# Patient Record
Sex: MALE | Race: WHITE | NOT HISPANIC OR LATINO | Employment: FULL TIME | URBAN - METROPOLITAN AREA
[De-identification: names, ages, dates, MRNs, and addresses within clinical notes are randomized per-mention and may not be internally consistent; named-entity substitution may affect disease eponyms.]

---

## 2022-10-26 ENCOUNTER — TRANSFERRED RECORDS (OUTPATIENT)
Dept: HEALTH INFORMATION MANAGEMENT | Facility: CLINIC | Age: 47
End: 2022-10-26

## 2022-10-27 ENCOUNTER — HOSPITAL ENCOUNTER (INPATIENT)
Facility: CLINIC | Age: 47
LOS: 2 days | Discharge: HOME | End: 2022-10-29
Attending: HOSPITALIST | Admitting: HOSPITALIST
Payer: COMMERCIAL

## 2022-10-27 ENCOUNTER — APPOINTMENT (OUTPATIENT)
Dept: ULTRASOUND IMAGING | Facility: CLINIC | Age: 47
End: 2022-10-27
Attending: HOSPITALIST
Payer: COMMERCIAL

## 2022-10-27 DIAGNOSIS — K27.4 GASTROINTESTINAL HEMORRHAGE ASSOCIATED WITH PEPTIC ULCER: Primary | ICD-10-CM

## 2022-10-27 DIAGNOSIS — K92.2 GASTROINTESTINAL HEMORRHAGE, UNSPECIFIED GASTROINTESTINAL HEMORRHAGE TYPE: ICD-10-CM

## 2022-10-27 DIAGNOSIS — J18.9 PNEUMONIA OF BOTH LOWER LOBES DUE TO INFECTIOUS ORGANISM: ICD-10-CM

## 2022-10-27 LAB
ABO/RH(D): NORMAL
ANION GAP SERPL CALCULATED.3IONS-SCNC: 4 MMOL/L (ref 3–14)
ANTIBODY SCREEN: NEGATIVE
BUN SERPL-MCNC: 9 MG/DL (ref 7–30)
CALCIUM SERPL-MCNC: 8.3 MG/DL (ref 8.5–10.1)
CHLORIDE BLD-SCNC: 104 MMOL/L (ref 94–109)
CO2 SERPL-SCNC: 27 MMOL/L (ref 20–32)
CREAT SERPL-MCNC: 0.95 MG/DL (ref 0.66–1.25)
ERYTHROCYTE [DISTWIDTH] IN BLOOD BY AUTOMATED COUNT: 23.1 % (ref 10–15)
GFR SERPL CREATININE-BSD FRML MDRD: >90 ML/MIN/1.73M2
GLUCOSE BLD-MCNC: 107 MG/DL (ref 70–99)
GLUCOSE BLDC GLUCOMTR-MCNC: 109 MG/DL (ref 70–99)
HCT VFR BLD AUTO: 32 % (ref 40–53)
HGB BLD-MCNC: 9.1 G/DL (ref 13.3–17.7)
HGB BLD-MCNC: 9.1 G/DL (ref 13.3–17.7)
INR PPP: 1.11 (ref 0.85–1.15)
LACTATE SERPL-SCNC: 1.2 MMOL/L (ref 0.7–2)
MAGNESIUM SERPL-MCNC: 2.2 MG/DL (ref 1.6–2.3)
MCH RBC QN AUTO: 20.7 PG (ref 26.5–33)
MCHC RBC AUTO-ENTMCNC: 28.4 G/DL (ref 31.5–36.5)
MCV RBC AUTO: 73 FL (ref 78–100)
PHOSPHATE SERPL-MCNC: 2.2 MG/DL (ref 2.5–4.5)
PLATELET # BLD AUTO: 197 10E3/UL (ref 150–450)
POTASSIUM BLD-SCNC: 3.8 MMOL/L (ref 3.4–5.3)
RADIOLOGIST FLAGS: ABNORMAL
RBC # BLD AUTO: 4.39 10E6/UL (ref 4.4–5.9)
SARS-COV-2 RNA RESP QL NAA+PROBE: NEGATIVE
SODIUM SERPL-SCNC: 135 MMOL/L (ref 133–144)
SPECIMEN EXPIRATION DATE: NORMAL
WBC # BLD AUTO: 8.1 10E3/UL (ref 4–11)

## 2022-10-27 PROCEDURE — 258N000003 HC RX IP 258 OP 636: Performed by: HOSPITALIST

## 2022-10-27 PROCEDURE — 86901 BLOOD TYPING SEROLOGIC RH(D): CPT | Performed by: HOSPITALIST

## 2022-10-27 PROCEDURE — 84100 ASSAY OF PHOSPHORUS: CPT | Performed by: HOSPITALIST

## 2022-10-27 PROCEDURE — C9113 INJ PANTOPRAZOLE SODIUM, VIA: HCPCS | Performed by: HOSPITALIST

## 2022-10-27 PROCEDURE — 83735 ASSAY OF MAGNESIUM: CPT | Performed by: HOSPITALIST

## 2022-10-27 PROCEDURE — 250N000013 HC RX MED GY IP 250 OP 250 PS 637: Performed by: HOSPITALIST

## 2022-10-27 PROCEDURE — 83605 ASSAY OF LACTIC ACID: CPT | Performed by: HOSPITALIST

## 2022-10-27 PROCEDURE — 85014 HEMATOCRIT: CPT | Performed by: HOSPITALIST

## 2022-10-27 PROCEDURE — 99223 1ST HOSP IP/OBS HIGH 75: CPT | Mod: AI | Performed by: HOSPITALIST

## 2022-10-27 PROCEDURE — 86850 RBC ANTIBODY SCREEN: CPT | Performed by: HOSPITALIST

## 2022-10-27 PROCEDURE — 85610 PROTHROMBIN TIME: CPT | Performed by: HOSPITALIST

## 2022-10-27 PROCEDURE — 93971 EXTREMITY STUDY: CPT | Mod: RT

## 2022-10-27 PROCEDURE — U0003 INFECTIOUS AGENT DETECTION BY NUCLEIC ACID (DNA OR RNA); SEVERE ACUTE RESPIRATORY SYNDROME CORONAVIRUS 2 (SARS-COV-2) (CORONAVIRUS DISEASE [COVID-19]), AMPLIFIED PROBE TECHNIQUE, MAKING USE OF HIGH THROUGHPUT TECHNOLOGIES AS DESCRIBED BY CMS-2020-01-R: HCPCS | Performed by: HOSPITALIST

## 2022-10-27 PROCEDURE — 120N000001 HC R&B MED SURG/OB

## 2022-10-27 PROCEDURE — HZ2ZZZZ DETOXIFICATION SERVICES FOR SUBSTANCE ABUSE TREATMENT: ICD-10-PCS | Performed by: HOSPITALIST

## 2022-10-27 PROCEDURE — 250N000011 HC RX IP 250 OP 636: Performed by: HOSPITALIST

## 2022-10-27 PROCEDURE — 80048 BASIC METABOLIC PNL TOTAL CA: CPT | Performed by: HOSPITALIST

## 2022-10-27 PROCEDURE — 36415 COLL VENOUS BLD VENIPUNCTURE: CPT | Performed by: HOSPITALIST

## 2022-10-27 RX ORDER — AZITHROMYCIN 500 MG/1
500 INJECTION, POWDER, LYOPHILIZED, FOR SOLUTION INTRAVENOUS ONCE
Status: DISCONTINUED | OUTPATIENT
Start: 2022-10-27 | End: 2022-10-27

## 2022-10-27 RX ORDER — ACETAMINOPHEN 650 MG/1
650 SUPPOSITORY RECTAL EVERY 6 HOURS PRN
Status: DISCONTINUED | OUTPATIENT
Start: 2022-10-27 | End: 2022-10-29 | Stop reason: HOSPADM

## 2022-10-27 RX ORDER — LOSARTAN POTASSIUM 25 MG/1
25 TABLET ORAL DAILY
COMMUNITY

## 2022-10-27 RX ORDER — FOLIC ACID 1 MG/1
1 TABLET ORAL DAILY
Status: DISCONTINUED | OUTPATIENT
Start: 2022-10-27 | End: 2022-10-27

## 2022-10-27 RX ORDER — LIDOCAINE 40 MG/G
CREAM TOPICAL
Status: DISCONTINUED | OUTPATIENT
Start: 2022-10-27 | End: 2022-10-28

## 2022-10-27 RX ORDER — PROCHLORPERAZINE 25 MG
25 SUPPOSITORY, RECTAL RECTAL EVERY 12 HOURS PRN
Status: DISCONTINUED | OUTPATIENT
Start: 2022-10-27 | End: 2022-10-29 | Stop reason: HOSPADM

## 2022-10-27 RX ORDER — FOLIC ACID 1 MG/1
1 TABLET ORAL DAILY
Status: DISCONTINUED | OUTPATIENT
Start: 2022-10-28 | End: 2022-10-29 | Stop reason: HOSPADM

## 2022-10-27 RX ORDER — SODIUM CHLORIDE, SODIUM LACTATE, POTASSIUM CHLORIDE, CALCIUM CHLORIDE 600; 310; 30; 20 MG/100ML; MG/100ML; MG/100ML; MG/100ML
INJECTION, SOLUTION INTRAVENOUS CONTINUOUS
Status: DISCONTINUED | OUTPATIENT
Start: 2022-10-27 | End: 2022-10-28

## 2022-10-27 RX ORDER — DIAZEPAM 10 MG/2ML
5-10 INJECTION, SOLUTION INTRAMUSCULAR; INTRAVENOUS EVERY 30 MIN PRN
Status: DISCONTINUED | OUTPATIENT
Start: 2022-10-27 | End: 2022-10-29 | Stop reason: HOSPADM

## 2022-10-27 RX ORDER — GABAPENTIN 300 MG/1
300 CAPSULE ORAL EVERY 8 HOURS
Status: DISCONTINUED | OUTPATIENT
Start: 2022-11-02 | End: 2022-10-29 | Stop reason: HOSPADM

## 2022-10-27 RX ORDER — BISACODYL 10 MG
10 SUPPOSITORY, RECTAL RECTAL DAILY PRN
Status: DISCONTINUED | OUTPATIENT
Start: 2022-10-27 | End: 2022-10-29 | Stop reason: HOSPADM

## 2022-10-27 RX ORDER — LIDOCAINE 40 MG/G
CREAM TOPICAL
Status: DISCONTINUED | OUTPATIENT
Start: 2022-10-27 | End: 2022-10-27

## 2022-10-27 RX ORDER — MULTIPLE VITAMINS W/ MINERALS TAB 9MG-400MCG
1 TAB ORAL DAILY
Status: DISCONTINUED | OUTPATIENT
Start: 2022-10-28 | End: 2022-10-29 | Stop reason: HOSPADM

## 2022-10-27 RX ORDER — AZITHROMYCIN 250 MG/1
250 TABLET, FILM COATED ORAL DAILY
Status: DISCONTINUED | OUTPATIENT
Start: 2022-10-28 | End: 2022-10-29 | Stop reason: HOSPADM

## 2022-10-27 RX ORDER — PROCHLORPERAZINE MALEATE 10 MG
10 TABLET ORAL EVERY 6 HOURS PRN
Status: DISCONTINUED | OUTPATIENT
Start: 2022-10-27 | End: 2022-10-29 | Stop reason: HOSPADM

## 2022-10-27 RX ORDER — GABAPENTIN 600 MG/1
1200 TABLET ORAL ONCE
Status: COMPLETED | OUTPATIENT
Start: 2022-10-27 | End: 2022-10-27

## 2022-10-27 RX ORDER — AMOXICILLIN 250 MG
2 CAPSULE ORAL 2 TIMES DAILY PRN
Status: DISCONTINUED | OUTPATIENT
Start: 2022-10-27 | End: 2022-10-29 | Stop reason: HOSPADM

## 2022-10-27 RX ORDER — OCTREOTIDE ACETATE 50 UG/ML
50 INJECTION, SOLUTION INTRAVENOUS; SUBCUTANEOUS ONCE
Status: DISCONTINUED | OUTPATIENT
Start: 2022-10-27 | End: 2022-10-27

## 2022-10-27 RX ORDER — CLOBETASOL PROPIONATE 0.5 MG/G
CREAM TOPICAL 2 TIMES DAILY
COMMUNITY

## 2022-10-27 RX ORDER — CLONIDINE HYDROCHLORIDE 0.1 MG/1
0.1 TABLET ORAL EVERY 8 HOURS
Status: DISCONTINUED | OUTPATIENT
Start: 2022-10-27 | End: 2022-10-27

## 2022-10-27 RX ORDER — AMOXICILLIN 250 MG
1 CAPSULE ORAL 2 TIMES DAILY PRN
Status: DISCONTINUED | OUTPATIENT
Start: 2022-10-27 | End: 2022-10-29 | Stop reason: HOSPADM

## 2022-10-27 RX ORDER — FLUMAZENIL 0.1 MG/ML
0.2 INJECTION, SOLUTION INTRAVENOUS
Status: DISCONTINUED | OUTPATIENT
Start: 2022-10-27 | End: 2022-10-29 | Stop reason: HOSPADM

## 2022-10-27 RX ORDER — GABAPENTIN 300 MG/1
900 CAPSULE ORAL EVERY 8 HOURS
Status: DISCONTINUED | OUTPATIENT
Start: 2022-10-28 | End: 2022-10-29 | Stop reason: HOSPADM

## 2022-10-27 RX ORDER — ACETAMINOPHEN 325 MG/1
650 TABLET ORAL EVERY 6 HOURS PRN
Status: DISCONTINUED | OUTPATIENT
Start: 2022-10-27 | End: 2022-10-29 | Stop reason: HOSPADM

## 2022-10-27 RX ORDER — NITROGLYCERIN 0.4 MG/1
0.4 TABLET SUBLINGUAL EVERY 5 MIN PRN
Status: DISCONTINUED | OUTPATIENT
Start: 2022-10-27 | End: 2022-10-28

## 2022-10-27 RX ORDER — HALOPERIDOL 5 MG/ML
2.5-5 INJECTION INTRAMUSCULAR EVERY 6 HOURS PRN
Status: DISCONTINUED | OUTPATIENT
Start: 2022-10-27 | End: 2022-10-29 | Stop reason: HOSPADM

## 2022-10-27 RX ORDER — POLYETHYLENE GLYCOL 3350 17 G/17G
17 POWDER, FOR SOLUTION ORAL DAILY PRN
Status: DISCONTINUED | OUTPATIENT
Start: 2022-10-27 | End: 2022-10-29 | Stop reason: HOSPADM

## 2022-10-27 RX ORDER — CEFTRIAXONE 2 G/1
2 INJECTION, POWDER, FOR SOLUTION INTRAMUSCULAR; INTRAVENOUS EVERY 24 HOURS
Status: DISCONTINUED | OUTPATIENT
Start: 2022-10-28 | End: 2022-10-29 | Stop reason: HOSPADM

## 2022-10-27 RX ORDER — OLANZAPINE 5 MG/1
5-10 TABLET, ORALLY DISINTEGRATING ORAL EVERY 6 HOURS PRN
Status: DISCONTINUED | OUTPATIENT
Start: 2022-10-27 | End: 2022-10-29 | Stop reason: HOSPADM

## 2022-10-27 RX ORDER — GABAPENTIN 300 MG/1
600 CAPSULE ORAL EVERY 8 HOURS
Status: DISCONTINUED | OUTPATIENT
Start: 2022-10-31 | End: 2022-10-29 | Stop reason: HOSPADM

## 2022-10-27 RX ORDER — MULTIPLE VITAMINS W/ MINERALS TAB 9MG-400MCG
1 TAB ORAL DAILY
Status: DISCONTINUED | OUTPATIENT
Start: 2022-10-27 | End: 2022-10-27

## 2022-10-27 RX ORDER — DIAZEPAM 5 MG
10 TABLET ORAL EVERY 30 MIN PRN
Status: DISCONTINUED | OUTPATIENT
Start: 2022-10-27 | End: 2022-10-29 | Stop reason: HOSPADM

## 2022-10-27 RX ORDER — GABAPENTIN 100 MG/1
100 CAPSULE ORAL EVERY 8 HOURS
Status: DISCONTINUED | OUTPATIENT
Start: 2022-11-04 | End: 2022-10-29 | Stop reason: HOSPADM

## 2022-10-27 RX ADMIN — SODIUM CHLORIDE, POTASSIUM CHLORIDE, SODIUM LACTATE AND CALCIUM CHLORIDE: 600; 310; 30; 20 INJECTION, SOLUTION INTRAVENOUS at 20:55

## 2022-10-27 RX ADMIN — GABAPENTIN 1200 MG: 600 TABLET, FILM COATED ORAL at 22:21

## 2022-10-27 RX ADMIN — PANTOPRAZOLE SODIUM 40 MG: 40 INJECTION, POWDER, FOR SOLUTION INTRAVENOUS at 22:22

## 2022-10-27 ASSESSMENT — ACTIVITIES OF DAILY LIVING (ADL)
DOING_ERRANDS_INDEPENDENTLY_DIFFICULTY: NO
CHANGE_IN_FUNCTIONAL_STATUS_SINCE_ONSET_OF_CURRENT_ILLNESS/INJURY: NO
ADLS_ACUITY_SCORE: 22
TOILETING_ISSUES: NO
WEAR_GLASSES_OR_BLIND: YES
WALKING_OR_CLIMBING_STAIRS_DIFFICULTY: YES
WALKING_OR_CLIMBING_STAIRS: OTHER (SEE COMMENTS)
TRANSFERRING: 1-->ASSISTANCE (EQUIPMENT/PERSON) NEEDED
CONCENTRATING,_REMEMBERING_OR_MAKING_DECISIONS_DIFFICULTY: NO
ADLS_ACUITY_SCORE: 35
VISION_MANAGEMENT: READING GLASSES
TRANSFERRING: 1-->ASSISTANCE (EQUIPMENT/PERSON) NEEDED (NOT DEVELOPMENTALLY APPROPRIATE)
DIFFICULTY_EATING/SWALLOWING: NO
DRESSING/BATHING_DIFFICULTY: NO
FALL_HISTORY_WITHIN_LAST_SIX_MONTHS: NO

## 2022-10-28 ENCOUNTER — ANESTHESIA (OUTPATIENT)
Dept: GASTROENTEROLOGY | Facility: CLINIC | Age: 47
End: 2022-10-28
Payer: COMMERCIAL

## 2022-10-28 ENCOUNTER — ANESTHESIA EVENT (OUTPATIENT)
Dept: GASTROENTEROLOGY | Facility: CLINIC | Age: 47
End: 2022-10-28
Payer: COMMERCIAL

## 2022-10-28 LAB
ALBUMIN SERPL-MCNC: 3 G/DL (ref 3.4–5)
ALP SERPL-CCNC: 66 U/L (ref 40–150)
ALT SERPL W P-5'-P-CCNC: 18 U/L (ref 0–70)
ANION GAP SERPL CALCULATED.3IONS-SCNC: 6 MMOL/L (ref 3–14)
AST SERPL W P-5'-P-CCNC: 23 U/L (ref 0–45)
BILIRUB DIRECT SERPL-MCNC: 0.2 MG/DL (ref 0–0.2)
BILIRUB SERPL-MCNC: 0.8 MG/DL (ref 0.2–1.3)
BUN SERPL-MCNC: 8 MG/DL (ref 7–30)
CALCIUM SERPL-MCNC: 8.5 MG/DL (ref 8.5–10.1)
CHLORIDE BLD-SCNC: 105 MMOL/L (ref 94–109)
CO2 SERPL-SCNC: 24 MMOL/L (ref 20–32)
CREAT SERPL-MCNC: 0.9 MG/DL (ref 0.66–1.25)
GFR SERPL CREATININE-BSD FRML MDRD: >90 ML/MIN/1.73M2
GLUCOSE BLD-MCNC: 94 MG/DL (ref 70–99)
HGB BLD-MCNC: 8.6 G/DL (ref 13.3–17.7)
HGB BLD-MCNC: 8.8 G/DL (ref 13.3–17.7)
INR PPP: 1.08 (ref 0.85–1.15)
PHOSPHATE SERPL-MCNC: 3.1 MG/DL (ref 2.5–4.5)
PHOSPHATE SERPL-MCNC: 3.1 MG/DL (ref 2.5–4.5)
PLATELET # BLD AUTO: 180 10E3/UL (ref 150–450)
POTASSIUM BLD-SCNC: 3.5 MMOL/L (ref 3.4–5.3)
POTASSIUM BLD-SCNC: 3.6 MMOL/L (ref 3.4–5.3)
PROT SERPL-MCNC: 7.1 G/DL (ref 6.8–8.8)
SODIUM SERPL-SCNC: 135 MMOL/L (ref 133–144)
UPPER GI ENDOSCOPY: NORMAL

## 2022-10-28 PROCEDURE — 999N000010 HC STATISTIC ANES STAT CODE-CRNA PER MINUTE: Performed by: INTERNAL MEDICINE

## 2022-10-28 PROCEDURE — 250N000009 HC RX 250: Performed by: INTERNAL MEDICINE

## 2022-10-28 PROCEDURE — C9113 INJ PANTOPRAZOLE SODIUM, VIA: HCPCS | Performed by: HOSPITALIST

## 2022-10-28 PROCEDURE — 120N000001 HC R&B MED SURG/OB

## 2022-10-28 PROCEDURE — 250N000013 HC RX MED GY IP 250 OP 250 PS 637: Performed by: HOSPITALIST

## 2022-10-28 PROCEDURE — 84132 ASSAY OF SERUM POTASSIUM: CPT | Performed by: INTERNAL MEDICINE

## 2022-10-28 PROCEDURE — 250N000011 HC RX IP 250 OP 636: Performed by: NURSE ANESTHETIST, CERTIFIED REGISTERED

## 2022-10-28 PROCEDURE — 258N000003 HC RX IP 258 OP 636: Performed by: HOSPITALIST

## 2022-10-28 PROCEDURE — 99233 SBSQ HOSP IP/OBS HIGH 50: CPT | Performed by: INTERNAL MEDICINE

## 2022-10-28 PROCEDURE — 370N000017 HC ANESTHESIA TECHNICAL FEE, PER MIN: Performed by: INTERNAL MEDICINE

## 2022-10-28 PROCEDURE — 36415 COLL VENOUS BLD VENIPUNCTURE: CPT | Performed by: HOSPITALIST

## 2022-10-28 PROCEDURE — 250N000013 HC RX MED GY IP 250 OP 250 PS 637: Performed by: INTERNAL MEDICINE

## 2022-10-28 PROCEDURE — 43235 EGD DIAGNOSTIC BRUSH WASH: CPT | Performed by: INTERNAL MEDICINE

## 2022-10-28 PROCEDURE — 250N000009 HC RX 250: Performed by: NURSE ANESTHETIST, CERTIFIED REGISTERED

## 2022-10-28 PROCEDURE — 0DJ08ZZ INSPECTION OF UPPER INTESTINAL TRACT, VIA NATURAL OR ARTIFICIAL OPENING ENDOSCOPIC: ICD-10-PCS | Performed by: INTERNAL MEDICINE

## 2022-10-28 PROCEDURE — 85018 HEMOGLOBIN: CPT | Performed by: HOSPITALIST

## 2022-10-28 PROCEDURE — 250N000011 HC RX IP 250 OP 636: Performed by: HOSPITALIST

## 2022-10-28 PROCEDURE — 258N000003 HC RX IP 258 OP 636: Performed by: INTERNAL MEDICINE

## 2022-10-28 PROCEDURE — 82248 BILIRUBIN DIRECT: CPT | Performed by: NURSE PRACTITIONER

## 2022-10-28 PROCEDURE — 999N000216 HC STATISTIC ADULT CD FACE TO FACE-NO CHRG

## 2022-10-28 PROCEDURE — 36415 COLL VENOUS BLD VENIPUNCTURE: CPT | Performed by: NURSE PRACTITIONER

## 2022-10-28 PROCEDURE — 85610 PROTHROMBIN TIME: CPT | Performed by: NURSE PRACTITIONER

## 2022-10-28 PROCEDURE — 84100 ASSAY OF PHOSPHORUS: CPT | Performed by: INTERNAL MEDICINE

## 2022-10-28 PROCEDURE — 82435 ASSAY OF BLOOD CHLORIDE: CPT | Performed by: HOSPITALIST

## 2022-10-28 PROCEDURE — 85049 AUTOMATED PLATELET COUNT: CPT | Performed by: INTERNAL MEDICINE

## 2022-10-28 RX ORDER — PROPOFOL 10 MG/ML
INJECTION, EMULSION INTRAVENOUS CONTINUOUS PRN
Status: DISCONTINUED | OUTPATIENT
Start: 2022-10-28 | End: 2022-10-28

## 2022-10-28 RX ORDER — FENTANYL CITRATE 50 UG/ML
INJECTION, SOLUTION INTRAMUSCULAR; INTRAVENOUS PRN
Status: DISCONTINUED | OUTPATIENT
Start: 2022-10-28 | End: 2022-10-28

## 2022-10-28 RX ORDER — SODIUM CHLORIDE, SODIUM LACTATE, POTASSIUM CHLORIDE, CALCIUM CHLORIDE 600; 310; 30; 20 MG/100ML; MG/100ML; MG/100ML; MG/100ML
INJECTION, SOLUTION INTRAVENOUS CONTINUOUS
Status: DISCONTINUED | OUTPATIENT
Start: 2022-10-28 | End: 2022-10-28 | Stop reason: HOSPADM

## 2022-10-28 RX ORDER — HYDROMORPHONE HCL IN WATER/PF 6 MG/30 ML
0.2 PATIENT CONTROLLED ANALGESIA SYRINGE INTRAVENOUS EVERY 5 MIN PRN
Status: DISCONTINUED | OUTPATIENT
Start: 2022-10-28 | End: 2022-10-28 | Stop reason: HOSPADM

## 2022-10-28 RX ORDER — OXYCODONE HYDROCHLORIDE 5 MG/1
5 TABLET ORAL EVERY 4 HOURS PRN
Status: DISCONTINUED | OUTPATIENT
Start: 2022-10-28 | End: 2022-10-29 | Stop reason: HOSPADM

## 2022-10-28 RX ORDER — LIDOCAINE 40 MG/G
CREAM TOPICAL
Status: DISCONTINUED | OUTPATIENT
Start: 2022-10-28 | End: 2022-10-28 | Stop reason: HOSPADM

## 2022-10-28 RX ORDER — ONDANSETRON 2 MG/ML
4 INJECTION INTRAMUSCULAR; INTRAVENOUS EVERY 30 MIN PRN
Status: DISCONTINUED | OUTPATIENT
Start: 2022-10-28 | End: 2022-10-28 | Stop reason: HOSPADM

## 2022-10-28 RX ORDER — HYDRALAZINE HYDROCHLORIDE 10 MG/1
10 TABLET, FILM COATED ORAL EVERY 6 HOURS PRN
Status: DISCONTINUED | OUTPATIENT
Start: 2022-10-28 | End: 2022-10-29 | Stop reason: HOSPADM

## 2022-10-28 RX ORDER — POTASSIUM CHLORIDE 1500 MG/1
20 TABLET, EXTENDED RELEASE ORAL ONCE
Status: COMPLETED | OUTPATIENT
Start: 2022-10-28 | End: 2022-10-28

## 2022-10-28 RX ORDER — FENTANYL CITRATE 50 UG/ML
25 INJECTION, SOLUTION INTRAMUSCULAR; INTRAVENOUS EVERY 5 MIN PRN
Status: DISCONTINUED | OUTPATIENT
Start: 2022-10-28 | End: 2022-10-28 | Stop reason: HOSPADM

## 2022-10-28 RX ORDER — LOSARTAN POTASSIUM 25 MG/1
25 TABLET ORAL DAILY
Status: DISCONTINUED | OUTPATIENT
Start: 2022-10-28 | End: 2022-10-29 | Stop reason: HOSPADM

## 2022-10-28 RX ORDER — ONDANSETRON 4 MG/1
4 TABLET, ORALLY DISINTEGRATING ORAL EVERY 30 MIN PRN
Status: DISCONTINUED | OUTPATIENT
Start: 2022-10-28 | End: 2022-10-28 | Stop reason: HOSPADM

## 2022-10-28 RX ADMIN — SODIUM CHLORIDE, POTASSIUM CHLORIDE, SODIUM LACTATE AND CALCIUM CHLORIDE: 600; 310; 30; 20 INJECTION, SOLUTION INTRAVENOUS at 12:01

## 2022-10-28 RX ADMIN — CEFTRIAXONE SODIUM 2 G: 2 INJECTION, POWDER, FOR SOLUTION INTRAMUSCULAR; INTRAVENOUS at 09:23

## 2022-10-28 RX ADMIN — GABAPENTIN 900 MG: 300 CAPSULE ORAL at 05:43

## 2022-10-28 RX ADMIN — TOPICAL ANESTHETIC 2 SPRAY: 200 SPRAY DENTAL; PERIODONTAL at 12:04

## 2022-10-28 RX ADMIN — SODIUM PHOSPHATE, MONOBASIC, MONOHYDRATE 15 MMOL: 276; 142 INJECTION, SOLUTION INTRAVENOUS at 01:02

## 2022-10-28 RX ADMIN — AZITHROMYCIN MONOHYDRATE 250 MG: 250 TABLET ORAL at 09:22

## 2022-10-28 RX ADMIN — SODIUM CHLORIDE, POTASSIUM CHLORIDE, SODIUM LACTATE AND CALCIUM CHLORIDE: 600; 310; 30; 20 INJECTION, SOLUTION INTRAVENOUS at 05:46

## 2022-10-28 RX ADMIN — FENTANYL CITRATE 50 MCG: 50 INJECTION, SOLUTION INTRAMUSCULAR; INTRAVENOUS at 12:04

## 2022-10-28 RX ADMIN — FOLIC ACID 1 MG: 1 TABLET ORAL at 09:21

## 2022-10-28 RX ADMIN — GABAPENTIN 900 MG: 300 CAPSULE ORAL at 21:05

## 2022-10-28 RX ADMIN — PANTOPRAZOLE SODIUM 40 MG: 40 INJECTION, POWDER, FOR SOLUTION INTRAVENOUS at 09:22

## 2022-10-28 RX ADMIN — MULTIPLE VITAMINS W/ MINERALS TAB 1 TABLET: TAB at 09:22

## 2022-10-28 RX ADMIN — POTASSIUM CHLORIDE 20 MEQ: 1500 TABLET, EXTENDED RELEASE ORAL at 17:17

## 2022-10-28 RX ADMIN — THIAMINE HCL TAB 100 MG 100 MG: 100 TAB at 09:22

## 2022-10-28 RX ADMIN — LOSARTAN POTASSIUM 25 MG: 25 TABLET, FILM COATED ORAL at 21:05

## 2022-10-28 RX ADMIN — PROPOFOL 200 MCG/KG/MIN: 10 INJECTION, EMULSION INTRAVENOUS at 12:05

## 2022-10-28 ASSESSMENT — ACTIVITIES OF DAILY LIVING (ADL)
ADLS_ACUITY_SCORE: 22

## 2022-10-28 ASSESSMENT — COPD QUESTIONNAIRES
COPD: 1
CAT_SEVERITY: MILD

## 2022-10-28 ASSESSMENT — LIFESTYLE VARIABLES: TOBACCO_USE: 1

## 2022-10-28 NOTE — PROVIDER NOTIFICATION
MD Notification    Notified Person: MD    Notified Person Name: Tommy Ruth    Notification Date/Time: 10/28/22 0126    Notification Interaction: text page    Purpose of Notification:Ultrasound to RLE results: superficial thrombophlebitis in right great saphenous vein. Pt currently denies pain.     Orders Received:    Comments:

## 2022-10-28 NOTE — INTERVAL H&P NOTE
"I have reviewed the surgical (or preoperative) H&P that is linked to this encounter, and examined the patient. There are no significant changes    Clinical Conditions Present on Arrival:  Clinically Significant Risk Factors Present on Admission           # Hyponatremia: Lowest Na = 135 mmol/L (Ref range: 136-145) in last 2 days, will monitor as appropriate      # Hypoalbuminemia: Lowest albumin = 3 g/dL (Ref range: 3.5-5.2) at 10/28/2022  5:35 AM, will monitor as appropriate     # Obesity: Estimated body mass index is 31.35 kg/m  as calculated from the following:    Height as of this encounter: 1.803 m (5' 11\").    Weight as of this encounter: 102 kg (224 lb 12.8 oz).       "

## 2022-10-28 NOTE — PROGRESS NOTES
[facility]    Hospitalist Progress Note    Assessment & Plan   Date of Admission:  10/27/2022        Assessment & Plan     Reji Marcum is a 47 year old male past medical history significant for alcohol abuse, HTN, HLD, DVT no longer taking anticoagulation who was directly admitted from [facility] on 10/27/22 for hematemesis, anemia, elevated lactic acid, and pneumonia.  Transferred to [facility] for GI evaluation.       Possible hematemesis,    Acute blood loss anemia  Hgb 6.8, lactate 4 at OSH. Transfused 2 units PRBC prior to transfer.  Reports large BRBPR 2 days ago, none since (hx of this in past attributed to internal hemorrhoid vs polyp per patient).  He denies hematemesis but this was reported by outside facility.  Endorses recent frequent cough with post-tussive emesis.  -Hb stable in 8-9 range follow blood transfusion at outside facility  - nl vitals to slightly hypertensive.   - seen by [facility].   -EGD showed: nl esophagus, A single 3 mm erosion with no stigmata of recent bleeding was found in the gastric antrum. The examined duodenum was normal.  - 2 brown stools since last episode of bloody stools.   -doubt further bleeding.   - unclear source.   -nl LFTs, nl lactate, bmp nl. Platelet count 180-197, INR 1.0  -no indication for colonoscopy during this hospital stay.,   -  Plan;   - [facility] consult  -regular diet per GI.   - stop  ml/hr  -[facility] to arrange for outpatient colonoscopy in coming 1-3 weeks.   - am cbc   -stop PPI    Alcohol use disorder, severe  Unspecified Depressive Disorder  Substance-Related & Addictive Disorders Alcohol Use Disorder     Presented to [place] overnight with hx EtOH abuse ( drinking 1/2 gallon whisky daily).   - CT revealed fatty liver but no reported cirrhosis.  Interested in resuming treatment at his prior treatment program.    -pt met by phone with chem dep, pt declined CD referrals or resources. Pt intends to follow up with his   and states able to contact prior cd treatment facility.   Afebrile. Not tachy, nl neuro exam. CIWA 0-1.   No sigsn etoh w/d  -pt met with psych team. Declines psych meds or services  Plan  - CIWA protocol   - oral vitamins  - gabapentin taper   -pcp follow up     Community acquired pneumonia  CXR and EKG ok. CT revealed pneumonia. Non-productive cough, no hypoxia.  Received ceftriaxone at OSH.   CT abd outside hospital showed bibasilar opacities.   - ~1 week cough prod sputum now dry cough, better. One episode of sputum with slight streak of blood.   -lungs clear. Off oxygen.     - continue ceftriaxone and azithro, treate for 7 days.   - Supplemental O2 as needed     Elevated lactic acid-resolved.   LA 4 at OSH. Already initiated on abx  - Repeat LA level 1.2.  No further bleeding   -nl bp to moderate htn.   - Monitor hemodynamics , q4 hours vitals  -stop imc status.   - Treat PNA as above     Hx RLE Superficial thrombophlebitis- greater saphanous vein (No dvt)  Was on DOAC but stopped this on his own after 30 days due to hematochezia.  -RLE US on admission showed:               1.  No deep venous thrombosis in the right lower extremity.     2.  Superficial thrombophlebitis in the right great saphenous vein within the proximal-mid thigh and proximal calf.    -pt recommended to have 6 month of anticoaguation and thinks he was told he had a DVT but on review of outside records, pt had NO DVT but did have superficial clot/thrombophlebitis of greater saphanous vein.   - no further indication of antiocoagulation at this time. Follow for sxs which has resolved.   -seek medical attention if recurrent RLE sxs.          HTN  Nl bmp  Restart losartant, follow bp     HLD  - not currently on treatment     Psoriasis  Noted     COVID screen negative 10/26/22           Diet: regular diet per gi    DVT Prophylaxis: Pneumatic Compression Devices  [place] Catheter: Not present  Central Lines: None  Cardiac Monitoring:  ACTIVE order. Indication: upper GI bleed on IMC  Code Status: Full Code  Full code per patient         Clinically Significant Risk Factors Present on Admission            # Hyponatremia: Lowest Na = 135 mmol/L (Ref range: 136-145) in last 2 days, will monitor as appropriate          # Hypertension: home medication list includes antihypertensive(s)                    Disposition Plan        Expected Discharge Date: 10/29/2022       Discussed care plan with Pt and GI and rn      Dale Montgomery MD, MD  Text Page  (7am to 6pm)  Interval History   egd this am. No signs etoh w/d   No cp,sob, n/v/d, abd pain  Cough is better.   Hb stable 9 range    -Data reviewed today: I reviewed all new labs and imaging results over the last 24 hours. I personally reviewed labs and imaging since admission.     Physical Exam   Temp: 98  F (36.7  C) Temp src: Oral BP: (!) 151/86 Pulse: 58   Resp: 28 SpO2: 99 % O2 Device: None (Room air)    Vitals:    10/27/22 2010 10/28/22 0029   Weight: 102.6 kg (226 lb 4.8 oz) 102 kg (224 lb 12.8 oz)     Vital Signs with Ranges  Temp:  [97.8  F (36.6  C)-98.6  F (37  C)] 98  F (36.7  C)  Pulse:  [45-75] 58  Resp:  [9-28] 28  BP: (131-182)/() 151/86  SpO2:  [97 %-100 %] 99 %  I/O last 3 completed shifts:  In: 1528.33 [P.O.:220; I.V.:1308.33]  Out: 4175 [Urine:4175]    Constitutional: Nad, up in bed  Respiratory: CTAB  Cardiovascular: RRR no r/g/m  GI: soft, nt, nd  Skin/Integumen: no rash or edema  No pain R LE. No venous cord or swelling  Neuro. No tremor, nl speech and mentation. Fully oriented.   Psych: nl affect.        Medications     lactated ringers Stopped (10/28/22 1209)       azithromycin  250 mg Oral Daily     cefTRIAXone  2 g Intravenous Q24H     folic acid  1 mg Oral Daily     [START ON 11/4/2022] gabapentin  100 mg Oral Q8H     [START ON 11/2/2022] gabapentin  300 mg Oral Q8H     [START ON 10/31/2022] gabapentin  600 mg Oral Q8H     gabapentin  900 mg Oral Q8H     multivitamin  w/minerals  1 tablet Oral Daily     pantoprazole  40 mg Intravenous BID     sodium chloride (PF)  3 mL Intracatheter Q8H     thiamine  100 mg Oral Daily       Data   Recent Labs   Lab 10/28/22  1300 10/28/22  0858 10/28/22  0535 10/27/22  2218 10/27/22  2049   WBC  --   --   --   --  8.1   HGB 8.6*  --  8.8*  --  9.1*  9.1*   MCV  --   --   --   --  73*   PLT  --  180  --   --  197   INR  --  1.08  --   --  1.11   NA  --   --  135  --  135   POTASSIUM  --  3.6 3.5  --  3.8   CHLORIDE  --   --  105  --  104   CO2  --   --  24  --  27   BUN  --   --  8  --  9   CR  --   --  0.90  --  0.95   ANIONGAP  --   --  6  --  4   MIKE  --   --  8.5  --  8.3*   GLC  --   --  94 109* 107*   ALBUMIN  --   --  3.0*  --   --    PROTTOTAL  --   --  7.1  --   --    BILITOTAL  --   --  0.8  --   --    ALKPHOS  --   --  66  --   --    ALT  --   --  18  --   --    AST  --   --  23  --   --        Imaging:   Recent Results (from the past 24 hour(s))   US Lower Extremity Venous Duplex Right   Result Value    Radiologist flags (Urgent)     Right great saphenous vein superficial thrombophlebitis.    Narrative    EXAM: US LOWER EXTREMITY VENOUS DUPLEX RIGHT  LOCATION: [facility]  DATE/TIME: 10/27/2022 9:34 PM    INDICATION: Right leg pain, history of right lower extremity DVT; noncompliant with anticoagulation. Assess whether resolved.  COMPARISON: The patient's prior ultrasound is not available for direct comparison.  TECHNIQUE: Venous Duplex ultrasound of the right lower extremity with and without compression, augmentation and duplex. Color flow and spectral Doppler with waveform analysis performed.    FINDINGS: Exam includes the common femoral, femoral, popliteal, and contralateral common femoral veins as well as segmentally visualized deep calf veins and greater saphenous vein.     RIGHT: No deep vein thrombosis. No popliteal cyst.    There is occlusive thrombus in the right great saphenous vein in the proximal  thigh. The thrombus extends from a level approximately 2.9 cm peripheral to the saphenofemoral junction into the mid thigh. There is also occlusive thrombus in the great   saphenous vein in the proximal calf.       Impression    IMPRESSION:  1.  No deep venous thrombosis in the right lower extremity.    2.  Superficial thrombophlebitis in the right great saphenous vein within the proximal-mid thigh and proximal calf.      [Access Center: Right great saphenous vein superficial thrombophlebitis.]    This report will be copied to the [facility] to ensure a provider acknowledges the finding. Access Center is available Monday through Friday 8am-3:30 pm.

## 2022-10-28 NOTE — ANESTHESIA POSTPROCEDURE EVALUATION
Patient: Reji Marcum    Procedure: Procedure(s):  ESOPHAGOGASTRODUODENOSCOPY (EGD)       Anesthesia Type:  MAC    Note:  Disposition: Inpatient   Postop Pain Control: Uneventful            Sign Out: Well controlled pain   PONV:    Neuro/Psych: Uneventful            Sign Out: Acceptable/Baseline neuro status   Airway/Respiratory: Uneventful            Sign Out: Acceptable/Baseline resp. status   CV/Hemodynamics: Uneventful            Sign Out: Acceptable CV status   Other NRE: NONE   DID A NON-ROUTINE EVENT OCCUR? No           Last vitals:  Vitals Value Taken Time   /90 10/28/22 1240   Temp     Pulse 55 10/28/22 1250   Resp 15 10/28/22 1251   SpO2 97 % 10/28/22 1252   Vitals shown include unvalidated device data.    Electronically Signed By: Wallace Carbajal MD  October 28, 2022  1:01 PM

## 2022-10-28 NOTE — PLAN OF CARE
Goal Outcome Evaluation:         Alert and oriented x4. Vital signs stable on RA, ex bradycardia and hypertension. SBA in room. Tolerating NPO diet. Lung sounds clear. + flatus, BM -, states last BM prior to ambulance transport from previous hospital. Adequate urine output. Denies pain and nausea. Tele SB 1st degree AVB with episodes of SB 2nd degree type 1 AVB. CIWA score 0-1. Ultrasound to R thigh this shift. LR running at 100 mL/hr.

## 2022-10-28 NOTE — PROVIDER NOTIFICATION
MD Notification    Notified Person: MD    Notified Person Name: Tommy Ruth    Notification Date/Time:10/28/22 2798    Notification Interaction: text page    Purpose of Notification:Pt has been hypertensive and low HR. Reports being off losartan for about 3-4 days now. Would you like to restart PTA BP med tomorrow or order a PRN for if he maintains over SBP>180?    Orders Received: hydralazine 10 mg PO every 6 hour PRN for SBP > 180     Comments:

## 2022-10-28 NOTE — UTILIZATION REVIEW
Admission Status; Secondary Review Determination       Under the authority of the Utilization Management Committee, the utilization review process indicated a secondary review on the above patient. The review outcome is based on review of the medical records, discussions with staff, and applying clinical experience noted on the date of the review.     (x) Inpatient Status Appropriate - This patient's medical care is consistent with medical management for inpatient care and reasonable inpatient medical practice.     RATIONALE FOR DETERMINATION   47 year old male with PMH including alcohol abuse, HTN, HLD, DVT no longer taking anticoagulation who was directly admitted from [facility] on 10/27/22 for hematemesis, anemia, elevated lactic acid, and pneumonia.  Received 2 units of blood at outside facility - hgb 6.8-8.8  Patient requires inpatient admission versus short stay observation or outpatient treatment for the following reasons: Patient presenting with hematemesis hematochezia and acute blood [place] anemia with hemoglobin below 7 lactate at 4 requiring 2 units of blood transfusion n.p.o. status comprehensive GI work-up in the setting of alcohol use disorder at significant risk for alcohol withdrawal presentation complicated by community-acquired pneumonia and lower extremity thrombophlebitis.  The expected length of stay at the time of admission was more than 2 nights because of the severity of illness, intensity of service provided, and risk for adverse outcome. Inpatient admission is appropriate.         This document was produced using voice recognition software       The information on this document is developed by the utilization review team in order for the business office to ensure compliance. This only denotes the appropriateness of proper admission status and does not reflect the quality of care rendered.   The definitions of Inpatient Status and Observation Status used in making the determination  above are those provided in the CMS Coverage Manual, Chapter 1 and Chapter 6, section 70.4.   Sincerely,   LEILANI RIVERS MD   System Medical Director   Utilization Management   [facility].

## 2022-10-28 NOTE — CONSULTS
10/28/2022      Spoke with pt via telephone to offer CD services. Pt reports he does not need an evaluation and referrals to inpatient CD treatment. Pt reports he does not need any CD resources. Pt reports he intends to follow up with his  to see what he needs to do. Pt reports he is able to contact his previous CD treatment facility ([place]) for care. Pt reports he may return to [place] where he has family. Pt is able to follow up with his providers for CD services.    [facility]   [address] [address] [address]  [phone]    ABELARDO Estrada  Phone: [phone]  Email: [email or web address]

## 2022-10-28 NOTE — CONSULTS
"Triage and Transition - Consult and Liaison     Reji Marcum  October 28, 2022    Session start: 2:50 pm  Session end: 3:21 pm   Session duration in minutes: 31  CPT utilized: 79218 - Brief diagnostic assessment (modifier 52)  Patient was seen virtually ([employer] cart or other teleconferencing device).    Diagnosis:   311 (F32.9) Unspecified Depressive Disorder , present;  Substance-Related & Addictive Disorders Alcohol Use Disorder   303.90 (F10.20) Severe, by history and present;       Plan/Recommendations:     Continue care coordination.    Pt declines any MH services or any desire for psych meds at this time, he states no many times.  Pt is open to therapy, however he states he will be moving to [place] with brother soon and will start there.     If pt were to change mind please re-consult.    Maintain current transition plan per care team.    Please enter another psychiatry if further visits are needed. Patients are not followed by Psychiatry C&L Service unless otherwise indicated.       Reason for consult: Psychiatry consult was requested due to \"alcohol use disorder\". Patient was seen by Triage and Transition Consult & Liaison team.      Identifying information: Reji is 47 year old Data Unavailable  male   followed related to \"alcohol use disorder\".   Pt reports he was \"born and raised in [place]\".  Pt reports he currently works at a [employer], and that he lives alone in \"[facility] currently, pt reports he is soon moving to [place] with family\".      Summary of Patient Situation  Pt agreeable to meet via  today.  Pt eating lunch, pt states his mood as \"I feel great, all my test came back good\".  Per chart review pt does not appear to endorse much of a psych. Hx with information available in epic at the time of this assessment.  Pt does endorse a CD hx with a hx of CD tx at [facility] program\".      Today pt presents with mild depression sx ,sadness, lonlieness, isolation, when asked " "about drinking he states \"I just sit at home and drink when I am alone, my brother and family just called and they are going to help support me and I plan to stay with them in [place], where I will have support\".  Pt talked at length about this plan, states his brother is \"flying to [place] from [place], will help him move his stuff and drive pts truck back to [place] with pt\".  Pt states he has been renting in [facility] prior to this.  Pt adamantly denies any major depression, or anxiety sx, he states \"nope, I just like to drink\".  Pt reports he has had periods of sobriety up to \"1 year and 6 months in 2018\" showed a tattoo representing this accomplishment, states \"I can and will do it again\".   Pt denies any SI, HI, AH/VH and.or bety sx.  Pt reports he is hopeful and is looking forward to seeing his brother.      Upon inquiry regarding therapy, pt denies any desire or offers for OP therapy appt setup.  Pt declines psych meds many times, states he will \"go to therapy in [place]\".  Pt talked briefly about his childhood and growing up with a \"70 year old father, and 18 year old mom, as well as foster care\".  Pt states this is what he plans to address when he starts OP therapy.  Clinician offered to set up OP MH appts in [place], pt declined.  If pt change mind please re-consult.      Brief Psychosocial History  Pt reports he currently works at a [employer], and that he lives alone in \"[facility] currently, pt reports he is soon moving to [place] with family\". Pt reports he was \"born and raised in [place]\".  Pt reports he is not  and does not have any children.     Significant Clinical History  Per chart review, pt does not appear to endorse a psych hsp hx, or much pf a MH hx at all, with information available in epic at the time of this assessment.  Pt does endorse CD Tx Hx with \"[facility]\".    Collateral information:   Reviewed chart.      Mental Status Exam   Affect: " Appropriate  Appearance: Appropriate   Attention Span/Concentration: Attentive    Eye Contact: Variable  Fund of Knowledge: Appropriate   Language /Speech Content: Fluent  Language /Speech Volume: Normal   Language /Speech Rate/Productions: Normal   Recent Memory: Intact  Remote Memory: Intact  Mood: Normal   Orientation:   Person: Yes   Place: Yes  Time of Day: Yes   Date: Yes   Situation (Do they understand why they are here?): Yes   Psychomotor Behavior: Normal   Thought Content: Clear  Thought Form: Intact    Current medications: Per EHR  Current Facility-Administered Medications   Medication     acetaminophen (TYLENOL) tablet 650 mg    Or     acetaminophen (TYLENOL) Suppository 650 mg     azithromycin (ZITHROMAX) tablet 250 mg     bisacodyl (DULCOLAX) suppository 10 mg     cefTRIAXone (ROCEPHIN) 2 g vial to attach to  ml bag for ADULTS or NS 50 ml bag for PEDS     diazepam (VALIUM) tablet 10 mg    Or     diazepam (VALIUM) injection 5-10 mg     flumazenil (ROMAZICON) injection 0.2 mg     folic acid (FOLVITE) tablet 1 mg     [START ON 11/4/2022] gabapentin (NEURONTIN) capsule 100 mg     [START ON 11/2/2022] gabapentin (NEURONTIN) capsule 300 mg     [START ON 10/31/2022] gabapentin (NEURONTIN) capsule 600 mg     gabapentin (NEURONTIN) capsule 900 mg     OLANZapine zydis (zyPREXA) ODT tab 5-10 mg    Or     haloperidol lactate (HALDOL) injection 2.5-5 mg     hydrALAZINE (APRESOLINE) tablet 10 mg     lactated ringers infusion     melatonin tablet 1 mg     melatonin tablet 5 mg     multivitamin w/minerals (THERA-VIT-M) tablet 1 tablet     oxyCODONE (ROXICODONE) tablet 5 mg     pantoprazole (PROTONIX) IV push injection 40 mg     polyethylene glycol (MIRALAX) Packet 17 g     prochlorperazine (COMPAZINE) injection 10 mg    Or     prochlorperazine (COMPAZINE) tablet 10 mg    Or     prochlorperazine (COMPAZINE) suppository 25 mg     senna-docusate (SENOKOT-S/PERICOLACE) 8.6-50 MG per tablet 1 tablet    Or      senna-docusate (SENOKOT-S/PERICOLACE) 8.6-50 MG per tablet 2 tablet     sodium chloride (PF) 0.9% PF flush 3 mL     sodium chloride (PF) 0.9% PF flush 3 mL     thiamine (B-1) tablet 100 mg      Therapeutic intervention and progress:  Therapeutic intervention consisted of building therapeutic rapport, active listening, validation, thought reframing, engaging in learning/practicing coping skills, stress relief practices and CBT concepts.    Iqra Watts MSW, LGSW  Triage and Transition - Consult and Liaison   [phone]

## 2022-10-28 NOTE — H&P
[facility]    History and Physical - Hospitalist Service       Date of Admission:  10/27/2022    Assessment & Plan      Reji Marcum is a 47 year old male past medical history significant for alcohol abuse, HTN, HLD, DVT no longer taking anticoagulation who was directly admitted from [facility] on 10/27/22 for hematemesis, anemia, elevated lactic acid, and pneumonia.  Transferred to [facility] for GI evaluation.       Possible hematemesis, suspect Magda Martinez tear  Hematochezia  Acute blood loss anemia  Hgb 6.8, lactate 4 at OSH. Transfused 2 units PRBC prior to transfer.  Reports large BRBPR 2 days ago, none since (hx of this in past attributed to internal hemorrhoid vs polyp per patient).  He denies hematemesis but this was reported by outside facility.  Endorses recent frequent cough with post-tussive emesis.  - STAT hgb and type and screen now, trend Hgb q6h (consented at admission)  - Transfuse for Hgb <7 (conditional orders entered)  - [facility] consult  - PPI IV bid, will not continue octreotide as no clear history of cirrhosis  - clear liquid diet, NPO midnight   -  ml/hr    Alcohol use disorder, severe  Presented to [place] overnight with hx EtOH abuse ( drinking 1/2 gallon whisky daily).  CT revealed fatty liver but no reported cirrhosis.  Interested in resuming treatment at his prior treatment program.    - Psychiatry and Chemical dependency consultations for EtOH abuse  - [facility] protocol   - oral vitamins  - currently hypertensive but will hold on clonidine with concern for GIB  - gabapentin taper     Community acquired pneumonia  CXR and EKG ok. CT revealed pneumonia. Non-productive cough, no hypoxia.  Received ceftriaxone at OSH.   - continue ceftriaxone and azithro  - Supplemental O2 as needed     Elevated lactic acid  LA 4 at OSH. Already initiated on abx  - Repeat LA level  - Monitor hemodynamics closely  - IMC status  - Treat PNA as above    Hx RLE DVT  Was  on DOAC but stopped this on his own after 30 days due to hematochezia.  - repeat RLE US    HTN  - hold PTA losartan for now in setting of GIB, resume as appropriate    HLD  - not currently on treatment    Psoriasis  Noted     COVID screen negative 10/26/22       Diet: Clear Liquid Diet  NPO per Anesthesia Guidelines for Procedure/Surgery Except for: Meds, Ice Chips clear liquids, NPO midnight   DVT Prophylaxis: Pneumatic Compression Devices  Rothman Catheter: Not present  Central Lines: None  Cardiac Monitoring: ACTIVE order. Indication: upper GI bleed on IMC  Code Status: Full Code  Full code per patient     Clinically Significant Risk Factors Present on Admission         # Hyponatremia: Lowest Na = 135 mmol/L (Ref range: 136-145) in last 2 days, will monitor as appropriate          # Hypertension: home medication list includes antihypertensive(s)             Disposition Plan      Expected Discharge Date: 10/29/2022              The patient's care was discussed with the Bedside Nurse and Patient.    Christo Vela MD  Hospitalist Service  [facility]  Securely message with the Vocera Web Console (learn more here)  Text page via [employer] Paging/Directory         ______________________________________________________________________    Chief Complaint   GI bleed     History is obtained from the patient and review of outside records.     History of Present Illness   Reji Marcum is a 47 year old male who was admitted to outside hospital with acute blood loss anemia, transferred to [facility] for GI evaluation.  He reports he has been drinking 1/2 gallon whiskey since leaving rehab in February of earlier this year.  He denies any history of alcohol withdrawal or seizure disorder, though outside records indicate that a friend reported he does develop withdrawal symptoms.  He reports interest in quitting alcohol as well as interest in returning to [place] for ongoing treatment.  He is  agreeable to psychiatry and chemical dependency evaluations.      He reports onset of dry cough approximately 4 days ago but denies any associated fevers or chills, sweats or shortness of breath.  Reports his cough has been so severe it has occasionally triggered emesis.  He denies any nausea or abdominal pain otherwise.  He denies any recent hematemesis or coffee-ground emesis.  Two days ago he reports an episode of bright red blood per rectum which happened on 1 occasion.  He has had no bleeding since.  Reports a prior history of bright red blood per rectum which was attributed to an internal hemorrhoid versus a colon polyp on prior colonoscopy in [place], [place].  Reports he is no longer taking anticoagulation as he took himself off of this once he developed recurrent bleeding.  He denies any current lightheadedness.   He denies any chest pain or pressure.  His remainder of review of systems otherwise negative.      Review of Systems    The 10 point Review of Systems is negative other than noted in the HPI or here.     Past Medical History    I have reviewed this patient's medical history and updated it with pertinent information if needed.   Past Medical History:   Diagnosis Date     Deep vein thrombosis (DVT) of right lower extremity (H)      HLD (hyperlipidemia)      HTN (hypertension)      Psoriasis        Past Surgical History   I have reviewed this patient's surgical history and updated it with pertinent information if needed.  Past Surgical History:   Procedure Laterality Date     CATARACT EXTRACTION         Social History   I have reviewed this patient's social history and updated it with pertinent information if needed.  Social History     Tobacco Use     Smoking status: Every Day     Packs/day: 0.50     Types: Cigarettes     Smokeless tobacco: Never   Substance Use Topics     Alcohol use: Yes     Comment: 1/2 gallon [employer] daily     Drug use: Never       Family History     Denies any history of CAD,  CVA or cancer.     Prior to Admission Medications   Prior to Admission Medications   Prescriptions Last Dose Informant Patient Reported? Taking?   clobetasol (TEMOVATE) 0.05 % external cream Past Month  Yes Yes   Sig: Apply topically 2 times daily   losartan (COZAAR) 25 MG tablet Past Week  Yes Yes   Sig: Take 25 mg by mouth daily   secukinumab (COSENTYX) 150 MG/ML Sensoready pen Past Month  Yes Yes   Sig: Inject 300 mg Subcutaneous every 28 days      Facility-Administered Medications: None     Allergies   No Known Allergies    Physical Exam   Vital Signs: Temp: 98.6  F (37  C) Temp src: Oral BP: (!) 168/94 Pulse: 57   Resp: 13 SpO2: 100 % O2 Device: None (Room air)    Weight: 226 lbs 4.8 oz    General Appearance: Well-nourished male in no acute distress  Eyes: Pupils equal, round reactive to light, sclera anicteric  HEENT: Mucous membranes moist, no lymphadenopathy  Respiratory: Lungs clear auscultation bilaterally, no wheeze or crackles, tachypnea  Cardiovascular: Regular rate and rhythm, S1/S2, no murmurs rubs or gallops  GI: Abdomen soft, nontender, nondistended, normal bowel sounds  Lymph/Hematologic: No peripheral edema  Musculoskeletal: Extremities warm and well-perfused  Neurologic: Alert and appropriate, cranial nerves grossly intact  Psychiatric: Normal affect    Data   Data reviewed today: I reviewed all medications, new labs and imaging results over the last 24 hours. I personally reviewed no images or EKG's today.    Recent Labs   Lab 10/27/22  2049   WBC 8.1   HGB 9.1*  9.1*   MCV 73*      INR 1.11      POTASSIUM 3.8   CHLORIDE 104   CO2 27   BUN 9   CR 0.95   ANIONGAP 4   MIKE 8.3*   *

## 2022-10-28 NOTE — PLAN OF CARE
Goal Outcome Evaluation:      Plan of Care Reviewed With: patient     IMC discontinued.   VSS. Alert and oriented x4. CIWA 0. NPO for EGD this morning, cleared to have regular diet. Up independently. Voiding +. Denies pain. No more episodes of bloody stool. Possible discharge tomorrow.

## 2022-10-28 NOTE — ANESTHESIA PREPROCEDURE EVALUATION
Anesthesia Pre-Procedure Evaluation    Patient: Reji Marcum   MRN: [id number] : 1975        Procedure : Procedure(s):  ESOPHAGOGASTRODUODENOSCOPY (EGD)          Past Medical History:   Diagnosis Date     Deep vein thrombosis (DVT) of right lower extremity (H)      HLD (hyperlipidemia)      HTN (hypertension)      Psoriasis       Past Surgical History:   Procedure Laterality Date     CATARACT EXTRACTION        No Known Allergies   Social History     Tobacco Use     Smoking status: Every Day     Packs/day: 0.50     Types: Cigarettes     Smokeless tobacco: Never   Substance Use Topics     Alcohol use: Yes     Comment:  gallon parrish daily      Wt Readings from Last 1 Encounters:   10/28/22 102 kg (224 lb 12.8 oz)        Anesthesia Evaluation            ROS/MED HX  ENT/Pulmonary: Comment: H/O PE  Not on blood thinner    (+) tobacco use, Current use, mild,  COPD,  (-) sleep apnea   Neurologic:  - neg neurologic ROS     Cardiovascular:     (+) Dyslipidemia hypertension----- (-) CAD   METS/Exercise Tolerance: >4 METS    Hematologic: Comments: Hb 8.8 today    6.8 was nidir    (+) History of blood clots, anemia,     Musculoskeletal:       GI/Hepatic: Comment: Melena  ? Magda-Martinze tear    (+) hepatitis type Alcoholic, liver disease,     Renal/Genitourinary:       Endo:  - neg endo ROS     Psychiatric/Substance Use:     (+) alcohol abuse     Infectious Disease:       Malignancy:       Other: Comment: Psoriasis on cosentyx           Physical Exam    Airway        Mallampati: III   TM distance: > 3 FB   Neck ROM: full   Mouth opening: > 3 cm    Respiratory Devices and Support         Dental     Comment: decay        Cardiovascular   cardiovascular exam normal          Pulmonary   pulmonary exam normal                OUTSIDE LABS:  CBC:   Lab Results   Component Value Date    WBC 8.1 10/27/2022    HGB 8.8 (L) 10/28/2022    HGB 9.1 (L) 10/27/2022    HGB 9.1 (L) 10/27/2022    HCT 32.0 (L) 10/27/2022    PLT  180 10/28/2022     10/27/2022     BMP:   Lab Results   Component Value Date     10/28/2022     10/27/2022    POTASSIUM 3.6 10/28/2022    POTASSIUM 3.5 10/28/2022    CHLORIDE 105 10/28/2022    CHLORIDE 104 10/27/2022    CO2 24 10/28/2022    CO2 27 10/27/2022    BUN 8 10/28/2022    BUN 9 10/27/2022    CR 0.90 10/28/2022    CR 0.95 10/27/2022    GLC 94 10/28/2022     (H) 10/27/2022     COAGS:   Lab Results   Component Value Date    INR 1.08 10/28/2022     POC: No results found for: BGM, HCG, HCGS  HEPATIC:   Lab Results   Component Value Date    ALBUMIN 3.0 (L) 10/28/2022    PROTTOTAL 7.1 10/28/2022    ALT 18 10/28/2022    AST 23 10/28/2022    ALKPHOS 66 10/28/2022    BILITOTAL 0.8 10/28/2022     OTHER:   Lab Results   Component Value Date    LACT 1.2 10/27/2022    MIKE 8.5 10/28/2022    PHOS 3.1 10/28/2022    MAG 2.2 10/27/2022       Anesthesia Plan    ASA Status:  2   NPO Status:  NPO Appropriate    Anesthesia Type: MAC.     - Reason for MAC: straight local not clinically adequate   Induction: Intravenous, Propofol.           Consents    Anesthesia Plan(s) and associated risks, benefits, and realistic alternatives discussed. Questions answered and patient/representative(s) expressed understanding.    - Discussed:     - Discussed with:  Patient      - Extended Intubation/Ventilatory Support Discussed: No.      - Patient is DNR/DNI Status: No    Use of blood products discussed: Yes.     - Discussed with: Patient.     - Consented: consented to blood products            Reason for refusal: other.     Postoperative Care            Comments:    Other Comments: Patient is counseled on the anesthesia plan and relevant anesthesia procedures including all risks and benefits. All patient questions were answered.             Wallace Carbajal MD

## 2022-10-28 NOTE — PHARMACY-ADMISSION MEDICATION HISTORY
Pharmacy Medication History  Admission medication history interview status for the 10/27/2022  admission is complete. See EPIC admission navigator for prior to admission medications     Location of Interview: Outside patient room but on unit  Medication history sources: Surescripts and Care Everywhere    Significant changes made to the medication list:  Added all medications    In the past week, patient estimated taking medication this percent of the time: 50-90% due to cost    Additional medication history information:   Per [facility] notes from [facility], patient was on xarelto 20 mg daily, last dose was more than a month ago--stopped taking this due to cost. This has been discontinued by [place] [facility] prior to admission here.     Medication reconciliation completed by provider prior to medication history? No    Time spent in this activity: 30 minutes    Prior to Admission medications    Medication Sig Last Dose Taking? Auth Provider Long Term End Date   clobetasol (TEMOVATE) 0.05 % external cream Apply topically 2 times daily Past Month Yes Unknown, Entered By History     losartan (COZAAR) 25 MG tablet Take 25 mg by mouth daily Past Week Yes Unknown, Entered By History Yes    secukinumab (COSENTYX) 150 MG/ML Sensoready pen Inject 300 mg Subcutaneous every 28 days Past Month Yes Unknown, Entered By History         The information provided in this note is only as accurate as the sources available at the time of update(s)

## 2022-10-28 NOTE — CONSULTS
Triage and Transition - Consult and Liaison     Reji Marcum  October 28, 2022    Psychiatry consult acknowledged.  Clinician attempted to see pt today via , however care team shared he is with endo currently. Consult unable to be completed at this time as pt unavailable.    2:50 pm:  Clinician checked in and pt available for consult now via ipad.    Iqra Watts MSW, LGSW  Triage and Transition - Consult and Liaison   [phone]

## 2022-10-28 NOTE — ANESTHESIA CARE TRANSFER NOTE
Patient: Reji Marcum    Procedure: Procedure(s):  ESOPHAGOGASTRODUODENOSCOPY (EGD)       Diagnosis: Melena [K92.1]  Diagnosis Additional Information: No value filed.    Anesthesia Type:   MAC     Note:    Oropharynx: oropharynx clear of all foreign objects  Level of Consciousness: awake  Oxygen Supplementation: room air    Independent Airway: airway patency satisfactory and stable  Dentition: dentition unchanged  Vital Signs Stable: post-procedure vital signs reviewed and stable  Report to RN Given: handoff report given  Patient transferred to: Phase II    Handoff Report: Identifed the Patient, Identified the Reponsible Provider, Reviewed the pertinent medical history, Discussed the surgical course, Reviewed Intra-OP anesthesia mangement and issues during anesthesia, Set expectations for post-procedure period and Allowed opportunity for questions and acknowledgement of understanding      Vitals:  Vitals Value Taken Time   BP     Temp     Pulse     Resp     SpO2         Electronically Signed By: BRISEYDA Burris CRNA  October 28, 2022  12:13 PM

## 2022-10-28 NOTE — CONSULTS
"GASTROENTEROLOGY CONSULTATION    Reji Marcum  [address] [facility] [address]  47 year old male    Admission Date/Time: 10/27/2022  Primary Care Provider: No primary care provider on file.    We were asked to see the patient in consultation by Dr. Vela for evaluation of upper GI bleed.    HPI: Reji Marcum is a 47 year old male with PMH including alcohol abuse, HTN, HLD, DVT no longer taking anticoagulation who was directly admitted from [facility] on 10/27/22 for hematemesis, anemia, elevated lactic acid, and pneumonia.  Transferred to [facility] for GI evaluation.      Hematemesis was reported at outside facility, pt reported BRBPR 3 days ago. Hgb 6.8 on presentation to OSH - transfused 2 units. Transferred to [facility]. Started on PPI BID. On Ceftriaxone and Azithromycin for pneumonia.       Lab at [facility]: hgb 9.1--8.8, BUN 9. Creat 0.95. INR 1.11. Vitals stable.     Diagnosed with RLE DVT previously but stopped anticoagulation on his own 30 days ago.     He started drinking a few weeks after leaving rehab in Feb 2022. Drinking amount progressed over time and he states he \"fell of the wagon\" one month ago, drinking 1/2 gallon whisky daily. Last drink was day of presentation. Denies alcohol withdrawal symptoms. Agreeable to psychiatry and chemical dependency consultations. Reports  Intermittent BRBPR \"only when drinking\", did not specify how often. Report intermittent GERD exacerbated by alcohol, no abdominal pain, n/v. He reports EGD/Colonoscopy in 2013 which showed \"raw\" stomach lining, internal hemorrhoids, and a polyp.      No family hx of any GI related conditions.     ROS: A comprehensive ten point review of systems was negative aside from those in mentioned in the HPI.      MEDICATIONS: No current facility-administered medications on file prior to encounter.  clobetasol (TEMOVATE) 0.05 % external cream, Apply topically 2 times daily  losartan (COZAAR) 25 MG tablet, Take 25 mg by " "mouth daily  secukinumab (COSENTYX) 150 MG/ML Sensoready pen, Inject 300 mg Subcutaneous every 28 days        ALLERGIES: No Known Allergies    Past Medical History:   Diagnosis Date     Deep vein thrombosis (DVT) of right lower extremity (H)      HLD (hyperlipidemia)      HTN (hypertension)      Psoriasis        Past Surgical History:   Procedure Laterality Date     CATARACT EXTRACTION         SOCIAL HISTORY:  Social History     Tobacco Use     Smoking status: Every Day     Packs/day: 0.50     Types: Cigarettes     Smokeless tobacco: Never   Substance Use Topics     Alcohol use: Yes     Comment: 1/2 [employer] daily     Drug use: Never       FAMILY HISTORY:  History reviewed. No pertinent family history.    PHYSICAL EXAM:   BP (!) 174/98   Pulse 53   Temp 97.8  F (36.6  C) (Axillary)   Resp 9   Ht 1.803 m (5' 11\")   Wt 102 kg (224 lb 12.8 oz)   SpO2 97%   BMI 31.35 kg/m     Constitutional: sleeping in between cares, no acute distress  Cardiovascular: regular rate and rhythm, no murmur or edema   Respiratory: clear to auscultation bilaterally  Psychiatric: normal pleasant affect  Head: atraumatic, normocephalic  Neck: supple, no thyromegaly  ENT: no scleral icterus   Abdomen: soft, non-tender, non-distended, normally active bowel sounds. No rebound tenderness or guarding   Neuro: Neurologically intact grossly  Skin: warm, dry, no rashes are noted      LABORATORY WORK  Recent Labs   Lab Test 10/28/22  0535 10/27/22  2049   WBC  --  8.1   HGB 8.8* 9.1*  9.1*   MCV  --  73*   PLT  --  197   INR  --  1.11     Recent Labs   Lab Test 10/28/22  0535 10/27/22  2049    135   POTASSIUM 3.5 3.8   CHLORIDE 105 104   CO2 24 27   BUN 8 9   CR 0.90 0.95   ANIONGAP 6 4   MIKE 8.5 8.3*     No lab results found.    IMAGING   CT ABD AND PELVIS WITH IV CONTRAST 10/26/22    IMPRESSION:   1. Hepatic fatty infiltration.   2. Suspected right nonobstructing nephrolithiasis, although assessment is   limited with contrast " present.   3. Urinary bladder appears full and may be abnormally enlarged, question   bladder outflow obstruction.  Postvoid urinary bladder ultrasound may be   considered.   4. Scattered foci of ground-glass attenuation within both lower lungs,   suggesting inflammatory process.  Multifocal pneumonia suspected.  Aspiration   felt to be less likely but could create this appearance.   5. No evidence for appendicitis, small bowel obstruction, or free air.   Additional findings as above.    CONSULTATION ASSESSMENT AND PLAN  47 year old male with PMH including alcohol abuse, HTN, HLD, DVT no longer taking anticoagulation who was directly admitted from [facility] on 10/27/22 for hematemesis, anemia, elevated lactic acid, and pneumonia.  Received 2 units of blood at outside facility - hgb 6.8-8.8. Hemodynamically stable. Differentials include: PUD, gastritis, Magda Martinez Tear, AVM, esophageal or gastric lesion, etc if there was hematemesis (reported at outside facility but pt denies). No overt bleeding since admission. No apparent cirrhosis on imaging and pt with normal INR and platelets. Differentials for intermittent BRBPR include internal hemorrhoids, small bowel or colonic AVM lesion/mass, colitis, etc.     Plan  -NPO  -PPI BID  -EGD today at 1PM with Dr. Figueroa  -Monitor stools  -Monitor hemoglobin and transfuse to keep >7   -Check LFTs  -Monitor/treat alcohol withdrawal  -Chemical dep/physiatry consultation     Total time: 35 minutes     I will discuss the patient plan with Dr. Figueroa. Thank you for asking us to participate in the care of this patient.    Nellie Gardner CNP  [facility]  Cell: [phone] until 12PM  Office: [phone]

## 2022-10-29 VITALS
BODY MASS INDEX: 31.47 KG/M2 | WEIGHT: 224.8 LBS | OXYGEN SATURATION: 97 % | HEART RATE: 72 BPM | SYSTOLIC BLOOD PRESSURE: 131 MMHG | RESPIRATION RATE: 16 BRPM | DIASTOLIC BLOOD PRESSURE: 88 MMHG | TEMPERATURE: 98.8 F | HEIGHT: 71 IN

## 2022-10-29 LAB
ERYTHROCYTE [DISTWIDTH] IN BLOOD BY AUTOMATED COUNT: 23.9 % (ref 10–15)
HCT VFR BLD AUTO: 32.6 % (ref 40–53)
HGB BLD-MCNC: 9.5 G/DL (ref 13.3–17.7)
MCH RBC QN AUTO: 20.6 PG (ref 26.5–33)
MCHC RBC AUTO-ENTMCNC: 29.1 G/DL (ref 31.5–36.5)
MCV RBC AUTO: 71 FL (ref 78–100)
PHOSPHATE SERPL-MCNC: 2.8 MG/DL (ref 2.5–4.5)
PLATELET # BLD AUTO: 179 10E3/UL (ref 150–450)
POTASSIUM BLD-SCNC: 3.6 MMOL/L (ref 3.4–5.3)
RBC # BLD AUTO: 4.62 10E6/UL (ref 4.4–5.9)
WBC # BLD AUTO: 9.5 10E3/UL (ref 4–11)

## 2022-10-29 PROCEDURE — 250N000013 HC RX MED GY IP 250 OP 250 PS 637: Performed by: HOSPITALIST

## 2022-10-29 PROCEDURE — 85027 COMPLETE CBC AUTOMATED: CPT | Performed by: INTERNAL MEDICINE

## 2022-10-29 PROCEDURE — 84132 ASSAY OF SERUM POTASSIUM: CPT | Performed by: INTERNAL MEDICINE

## 2022-10-29 PROCEDURE — 84100 ASSAY OF PHOSPHORUS: CPT | Performed by: INTERNAL MEDICINE

## 2022-10-29 PROCEDURE — 99238 HOSP IP/OBS DSCHRG MGMT 30/<: CPT | Performed by: INTERNAL MEDICINE

## 2022-10-29 PROCEDURE — 250N000013 HC RX MED GY IP 250 OP 250 PS 637: Performed by: INTERNAL MEDICINE

## 2022-10-29 PROCEDURE — 36415 COLL VENOUS BLD VENIPUNCTURE: CPT | Performed by: INTERNAL MEDICINE

## 2022-10-29 RX ORDER — AZITHROMYCIN 250 MG/1
250 TABLET, FILM COATED ORAL DAILY
Qty: 2 TABLET | Refills: 0 | Status: SHIPPED | OUTPATIENT
Start: 2022-10-30 | End: 2022-11-01

## 2022-10-29 RX ORDER — LANOLIN ALCOHOL/MO/W.PET/CERES
100 CREAM (GRAM) TOPICAL DAILY
Qty: 7 TABLET | Refills: 0 | Status: SHIPPED | OUTPATIENT
Start: 2022-10-29 | End: 2022-11-05

## 2022-10-29 RX ORDER — POTASSIUM CHLORIDE 1500 MG/1
40 TABLET, EXTENDED RELEASE ORAL ONCE
Status: COMPLETED | OUTPATIENT
Start: 2022-10-29 | End: 2022-10-29

## 2022-10-29 RX ORDER — NALOXONE HYDROCHLORIDE 0.4 MG/ML
0.4 INJECTION, SOLUTION INTRAMUSCULAR; INTRAVENOUS; SUBCUTANEOUS
Status: DISCONTINUED | OUTPATIENT
Start: 2022-10-29 | End: 2022-10-29 | Stop reason: HOSPADM

## 2022-10-29 RX ORDER — FERROUS GLUCONATE 324(38)MG
324 TABLET ORAL
Qty: 15 TABLET | Refills: 0 | Status: SHIPPED | OUTPATIENT
Start: 2022-10-29

## 2022-10-29 RX ORDER — FOLIC ACID 1 MG/1
1 TABLET ORAL DAILY
Qty: 25 TABLET | Refills: 0 | Status: SHIPPED | OUTPATIENT
Start: 2022-10-29 | End: 2022-11-23

## 2022-10-29 RX ORDER — MULTIPLE VITAMINS W/ MINERALS TAB 9MG-400MCG
1 TAB ORAL DAILY
Qty: 30 TABLET | Refills: 0 | Status: SHIPPED | OUTPATIENT
Start: 2022-10-29

## 2022-10-29 RX ORDER — CEFUROXIME AXETIL 500 MG/1
500 TABLET ORAL 2 TIMES DAILY
Qty: 10 TABLET | Refills: 0 | Status: SHIPPED | OUTPATIENT
Start: 2022-10-30 | End: 2022-11-04

## 2022-10-29 RX ORDER — NALOXONE HYDROCHLORIDE 0.4 MG/ML
0.2 INJECTION, SOLUTION INTRAMUSCULAR; INTRAVENOUS; SUBCUTANEOUS
Status: DISCONTINUED | OUTPATIENT
Start: 2022-10-29 | End: 2022-10-29 | Stop reason: HOSPADM

## 2022-10-29 RX ADMIN — POTASSIUM CHLORIDE 40 MEQ: 1500 TABLET, EXTENDED RELEASE ORAL at 09:59

## 2022-10-29 RX ADMIN — MULTIPLE VITAMINS W/ MINERALS TAB 1 TABLET: TAB at 09:59

## 2022-10-29 RX ADMIN — AZITHROMYCIN MONOHYDRATE 250 MG: 250 TABLET ORAL at 09:59

## 2022-10-29 RX ADMIN — THIAMINE HCL TAB 100 MG 100 MG: 100 TAB at 09:59

## 2022-10-29 RX ADMIN — FOLIC ACID 1 MG: 1 TABLET ORAL at 09:59

## 2022-10-29 RX ADMIN — LOSARTAN POTASSIUM 25 MG: 25 TABLET, FILM COATED ORAL at 09:59

## 2022-10-29 RX ADMIN — GABAPENTIN 900 MG: 300 CAPSULE ORAL at 05:24

## 2022-10-29 ASSESSMENT — ACTIVITIES OF DAILY LIVING (ADL)
ADLS_ACUITY_SCORE: 22

## 2022-10-29 NOTE — PLAN OF CARE
1900h-0700h: Pt AOx4. CIWA-0. O2Sat >92% on RA. Clear bilateral breath sound. Tolerates Regular diet. Continent B/B. Voiding adequately. Last BM 10/27. No signs of GI bleeding noted. Denies n/v or abdominal pain. Ambulates in the room independently. PIV on R arm & hand, SL. No edema on BLE, sensation intact w/ palpable pulses.  Tele:NSR  K replaced at 1900h.   For K, Ph & CBC at 6am.  Possible discharge to home 10/29.

## 2022-10-29 NOTE — DISCHARGE SUMMARY
[facility]    Discharge Summary  Hospitalist    Date of Admission:  10/27/2022  Date of Discharge:  10/29/2022  Discharging Provider: Dale Montgomery MD, MD    Discharge Diagnoses   GI Bleed, suspect lower source ,needs outpatient colonoscopy.   Acute blood loss anemia  Recent RLE superficial thrombophlebitis (NO DVT)  Alcohol use disorder, severe  Unspecified Depressive Disorder  Community acquired pneumonia    History of Present Illness   Reji Marcum is a 47 year old male who was admitted to outside hospital with acute blood loss anemia, transferred to [facility] for GI evaluation.  He reports he has been drinking 1/2 gallon whiskey since leaving rehab in February of earlier this year.  He denies any history of alcohol withdrawal or seizure disorder, though outside records indicate that a friend reported he does develop withdrawal symptoms.  He reports interest in quitting alcohol as well as interest in returning to [facility] for ongoing treatment.  He is agreeable to psychiatry and chemical dependency evaluations.       He reports onset of dry cough approximately 4 days ago but denies any associated fevers or chills, sweats or shortness of breath.  Reports his cough has been so severe it has occasionally triggered emesis.  He denies any nausea or abdominal pain otherwise.  He denies any recent hematemesis or coffee-ground emesis.  Two days ago he reports an episode of bright red blood per rectum which happened on 1 occasion.  He has had no bleeding since.  Reports a prior history of bright red blood per rectum which was attributed to an internal hemorrhoid versus a colon polyp on prior colonoscopy in [place], [place].  Reports he is no longer taking anticoagulation as he took himself off of this once he developed recurrent bleeding.  He denies any current lightheadedness.   He denies any chest pain or pressure.  His remainder of review of systems otherwise  negative.          Hospital Course   Reji Marcum was admitted on 10/27/2022.  The following problems were addressed during his hospitalization:    Principal Problem:    GI bleed  Date of Admission:  10/27/2022        Assessment & Plan     Reji Marcum is a 47 year old male past medical history significant for alcohol abuse, HTN, HLD, DVT no longer taking anticoagulation who was directly admitted from [facility] on 10/27/22 for hematemesis, anemia, elevated lactic acid, and pneumonia.  Transferred to [facility] for GI evaluation.       GI bleed  Acute blood loss anemia  Hgb 6.8, lactate 4 at OSH. Transfused 2 units PRBC prior to transfer.  Reports large BRBPR 2 days ago, none since (hx of this in past attributed to internal hemorrhoid vs polyp per patient).  He denies hematemesis but this was reported by outside facility.  Endorses recent frequent cough with post-tussive emesis.  -Hb stable in 8-9 range follow blood transfusion at outside facility  - nl vitals to slightly hypertensive.   - seen by [facility].   -EGD showed: nl esophagus, A single 3 mm erosion with no stigmata of recent bleeding was found in the gastric antrum. The examined duodenum was normal.  - 2 brown stools since last episode of bloody stools.   -doubt further bleeding.   - unclear source.   -nl LFTs, nl lactate, bmp nl. Platelet count 180-197, INR 1.0  -no indication for colonoscopy during this hospital stay.,   -pt denies hematemesis but had slightly blood streak with sputum X 1  - had bloody stools on and off anticoagulation. Resolved. Passing brown stools  - benign abd  - ok for discharge. Pt taking po and feeling. Passing nl stools  - no longer has an indication for anticoagulation  - follow up with [facility] for outpatient colonoscopy (or patient will follow up with GI MD in [place]). Strongly encouraged follow up with GI for colonoscopy.   Follow up Hb with pcp 1 week and in several weeks with new PCP  1 month iron Rx  every other day with follow up Hb with pcp     Alcohol use disorder, severe  Unspecified Depressive Disorder  Substance-Related & Addictive Disorders Alcohol Use Disorder     Presented to [facility] overnight with hx EtOH abuse ( drinking 1/2 gallon whisky daily).   - CT revealed fatty liver but no reported cirrhosis.  Interested in resuming treatment at his prior treatment program.    -pt met by phone with chem dep, pt declined CD referrals or resources. Pt intends to follow up with his  and states able to contact prior cd treatment facility.   Afebrile. Not tachy, nl neuro exam. CIWA 0-1.   No sigsn etoh w/d  -pt met with psych team. Declines psych meds or services  -follow up with pcp. Discharge on MV, folate and thiamine. Encouraged cessation from alcohol.          Community acquired pneumonia  CXR and EKG ok. CT revealed pneumonia. Non-productive cough, no hypoxia.  Received ceftriaxone at OSH.   CT abd outside hospital showed bibasilar opacities.   - ~1 week cough prod sputum now dry cough, better. One episode of sputum with slight streak of blood.   -lungs clear. Off oxygen.   -cough resolved. Doing well. CAP vs aspiration  - complete course of abx with cefuroxime and azithromycin  -pcp follow up. Stop etoh        Elevated lactic acid-resolved.   LA 4 at OSH. Already initiated on abx  - Repeat LA level 1.2.  No further bleeding   -nl bp to moderate htn.   - Monitor hemodynamics , q4 hours vitals  -stop imc status.   - Treat PNA as above     Hx RLE Superficial thrombophlebitis- greater saphanous vein (No dvt)  Was on DOAC but stopped this on his own after 30 days due to hematochezia.  -RLE US on admission showed:               1.  No deep venous thrombosis in the right lower extremity.     2.  Superficial thrombophlebitis in the right great saphenous vein within the proximal-mid thigh and proximal calf.     -pt recommended to have 6 month of anticoaguation and thinks he was told he had a DVT but  on review of outside records, pt had NO DVT but did have superficial clot/thrombophlebitis of greater saphanous vein.   - no further indication of antiocoagulation at this time, especially in light of his GI beed. Follow for sxs which has resolved. Warm compresses bid to tid R proximal leg/thigh for 2 weeks.   -seek medical attention if recurrent RLE sxs.   -updated pt on care plan.   -pcp follow up           HTN  Nl bmp  Restart losartant, follow bp     HLD  - not currently on treatment     Psoriasis  Noted     COVID screen negative 10/26/22           Diet: regular diet per gi     DVT Prophylaxis: Pneumatic Compression Devices  [place] Catheter: Not present  Central Lines: None  Cardiac Monitoring: ACTIVE order. Indication: upper GI bleed on IMC  Code Status: Full Code  Full code per patient         Clinically Significant Risk Factors Present on Admission             # Hyponatremia: Lowest Na = 135 mmol/L (Ref range: 136-145) in last 2 days, will monitor as appropriate          # Hypertension: home medication list includes antihypertensive(s)                    Disposition Plan-discharge home.         Expected Discharge Date: 10/29/2022         Discussed care plan with Pt and rn         Dale Montgomery MD, MD    Significant Results and Procedures   See hospital course    Pending Results   none  Unresulted Labs Ordered in the Past 30 Days of this Admission     No orders found from 9/27/2022 to 10/28/2022.          Code Status   Full Code       Primary Care Physician   No primary care provider on file.    Physical Exam   Temp: 98.8  F (37.1  C) Temp src: Oral BP: 131/88 Pulse: 72   Resp: 16 SpO2: 97 % O2 Device: None (Room air)    Vitals:    10/27/22 2010 10/28/22 0029   Weight: 102.6 kg (226 lb 4.8 oz) 102 kg (224 lb 12.8 oz)     Vital Signs with Ranges  Temp:  [97.9  F (36.6  C)-98.8  F (37.1  C)] 98.8  F (37.1  C)  Pulse:  [57-75] 72  Resp:  [9-28] 16  BP: (122-159)/(66-91) 131/88  SpO2:  [97 %-99 %] 97 %  I/O  last 3 completed shifts:  In: 1768.33 [P.O.:460; I.V.:1308.33]  Out: 1100 [Urine:1100]    Constitutional: nad, in bed  Respiratory: CTAB  Cardiovascular: RRR no r/g/m. Not tachy  GI: soft,nt, nd  Skin: no rash or edema  Musculoskeletal: no focal jt swelling or redness  Neurologic: no tremor, alert, coherent. Nl speech and mentation  Neuropsychiatric: nl affect    Discharge Disposition   Discharged to home  Condition at discharge: Good    Consultations This Hospital Stay   GASTROENTEROLOGY IP CONSULT  CHEMICAL DEPENDENCY IP CONSULT  PSYCHIATRY IP CONSULT    Time Spent on this Encounter   I, Dale Montgomery MD, personally saw the patient today and spent less than or equal to 30 minutes discharging this patient.    Discharge Orders      Reason for your hospital stay    1. Lower gastrointestinal bleed. No further bleeding. Normal EGD/upper endoscopy on 10/28. Need outpatient colonoscopy to further evaluate lower gastrointestinal bleed locally or in [place] within 1-4 weeks.   2. No further need for blood thinners.  I reviewed your ultrasound of your right leg and you did NOT have a DVT, you had a superficial blood clot in superficial saphanous vein. Use warm compress 2-3 times per day for 1 -2 weeks to help blood flow. Seek medical attention if have recurrent pain or swelling in right leg as you will need a repeat ultrasound.  Follow up with your primary care doctor next week. Would have print out of records from them to take to [place].     Activity    Your activity upon discharge: activity as tolerated     Discharge Instructions    1. Complete course of antibiotics  2. Complete course of vitamins  3. See other instructions above.     Follow-up and recommended labs and tests     1. Follow up with primary care doctor next week with Hb/hemoglobin.   2. [facility] will contact you for arranging colonoscopy. If not done in [place], then need to have done in [place].   3. Establish care with doctor in  [place] right away when arrive. Need repeat Hb blood level then and referral to gastroenterologist if colonoscopy not done in [place].   4. Need Hb level monitored going forward to ensure it normalizes. Complete course of iron but may need further iron therapy  5. Stop all alcohol, follow up with your resources as you have planned.     Full Code     Diet    Follow this diet upon discharge: Orders Placed This Encounter      Regular Diet Adult     Discharge Medications   Discharge Medication List as of 10/29/2022 10:56 AM      START taking these medications    Details   azithromycin (ZITHROMAX) 250 MG tablet Take 1 tablet (250 mg) by mouth daily for 2 days, Disp-2 tablet, R-0, E-Prescribe      cefuroxime (CEFTIN) 500 MG tablet Take 1 tablet (500 mg) by mouth 2 times daily for 5 days, Disp-10 tablet, R-0, E-Prescribe      ferrous gluconate (FERGON) 324 (38 Fe) MG tablet Take 1 tablet (324 mg) by mouth every 48 hours, Disp-15 tablet, R-0, E-PrescribeFuture refills by PCP  No primary care provider on file. with phone number None.      folic acid (FOLVITE) 1 MG tablet Take 1 tablet (1 mg) by mouth daily for 25 days, Disp-25 tablet, R-0, E-PrescribeFuture refills by PCP  No primary care provider on file. with phone number None.      multivitamin w/minerals (THERA-VIT-M) tablet Take 1 tablet by mouth daily, Disp-30 tablet, R-0, E-PrescribeFuture refills by PCP Dr. Martel primary care provider on file. with phone number None.      thiamine (B-1) 100 MG tablet Take 1 tablet (100 mg) by mouth daily for 7 days, Disp-7 tablet, R-0, E-Prescribe         CONTINUE these medications which have NOT CHANGED    Details   clobetasol (TEMOVATE) 0.05 % external cream Apply topically 2 times dailyHistorical      losartan (COZAAR) 25 MG tablet Take 25 mg by mouth daily, Historical      secukinumab (COSENTYX) 150 MG/ML Sensoready pen Inject 300 mg Subcutaneous every 28 days, Historical           Allergies   No Known Allergies  Data   Most  Recent 3 CBC's:Recent Labs   Lab Test 10/29/22  0530 10/28/22  1300 10/28/22  0858 10/28/22  0535 10/27/22  2049   WBC 9.5  --   --   --  8.1   HGB 9.5* 8.6*  --  8.8* 9.1*  9.1*   MCV 71*  --   --   --  73*     --  180  --  197      Most Recent 3 BMP's:  Recent Labs   Lab Test 10/29/22  0530 10/28/22  0858 10/28/22  0535 10/27/22  2218 10/27/22  2049   NA  --   --  135  --  135   POTASSIUM 3.6 3.6 3.5  --  3.8   CHLORIDE  --   --  105  --  104   CO2  --   --  24  --  27   BUN  --   --  8  --  9   CR  --   --  0.90  --  0.95   ANIONGAP  --   --  6  --  4   MIKE  --   --  8.5  --  8.3*   GLC  --   --  94 109* 107*     Most Recent 2 LFT's:  Recent Labs   Lab Test 10/28/22  0535   AST 23   ALT 18   ALKPHOS 66   BILITOTAL 0.8     Most Recent INR's and Anticoagulation Dosing History:  Anticoagulation Dose History     Recent Dosing and Labs Latest Ref Rng & Units 10/27/2022 10/28/2022    INR 0.85 - 1.15 1.11 1.08        Most Recent 3 Troponin's:No lab results found.  Most Recent Cholesterol Panel:No lab results found.  Most Recent 6 Bacteria Isolates From Any Culture (See EPIC Reports for Culture Details):No lab results found.  Most Recent TSH, T4 and A1c Labs:No lab results found.  Results for orders placed or performed during the hospital encounter of 10/27/22   US Lower Extremity Venous Duplex Right     Value    Radiologist flags (Urgent)     Right great saphenous vein superficial thrombophlebitis.    Narrative    EXAM: US LOWER EXTREMITY VENOUS DUPLEX RIGHT  LOCATION: [facility]  DATE/TIME: 10/27/2022 9:34 PM    INDICATION: Right leg pain, history of right lower extremity DVT; noncompliant with anticoagulation. Assess whether resolved.  COMPARISON: The patient's prior ultrasound is not available for direct comparison.  TECHNIQUE: Venous Duplex ultrasound of the right lower extremity with and without compression, augmentation and duplex. Color flow and spectral Doppler with waveform  analysis performed.    FINDINGS: Exam includes the common femoral, femoral, popliteal, and contralateral common femoral veins as well as segmentally visualized deep calf veins and greater saphenous vein.     RIGHT: No deep vein thrombosis. No popliteal cyst.    There is occlusive thrombus in the right great saphenous vein in the proximal thigh. The thrombus extends from a level approximately 2.9 cm peripheral to the saphenofemoral junction into the mid thigh. There is also occlusive thrombus in the great   saphenous vein in the proximal calf.       Impression    IMPRESSION:  1.  No deep venous thrombosis in the right lower extremity.    2.  Superficial thrombophlebitis in the right great saphenous vein within the proximal-mid thigh and proximal calf.      [Access Center: Right great saphenous vein superficial thrombophlebitis.]    This report will be copied to the [facility] to ensure a provider acknowledges the finding. Access Center is available Monday through Friday 8am-3:30 pm.

## 2022-11-01 ENCOUNTER — PATIENT OUTREACH (OUTPATIENT)
Dept: CARE COORDINATION | Facility: CLINIC | Age: 47
End: 2022-11-01
